# Patient Record
Sex: FEMALE | Race: WHITE | URBAN - METROPOLITAN AREA
[De-identification: names, ages, dates, MRNs, and addresses within clinical notes are randomized per-mention and may not be internally consistent; named-entity substitution may affect disease eponyms.]

---

## 2018-02-23 ENCOUNTER — HOSPITAL ENCOUNTER (OUTPATIENT)
Dept: OTHER | Age: 38
Discharge: HOME | End: 2018-02-23
Attending: NURSE PRACTITIONER | Admitting: NURSE PRACTITIONER

## 2018-02-23 LAB
A/G RATIO: 1.4 (ref 1.1–2.2)
ALBUMIN SERPL-MCNC: 4.6 G/DL (ref 3.4–5)
ALP BLD-CCNC: 63 U/L (ref 40–129)
ALT SERPL-CCNC: 90 U/L (ref 10–40)
ANION GAP SERPL CALCULATED.3IONS-SCNC: 14 MMOL/L (ref 3–16)
AST SERPL-CCNC: 55 U/L (ref 15–37)
BILIRUB SERPL-MCNC: 0.6 MG/DL (ref 0–1)
BUN BLDV-MCNC: 11 MG/DL (ref 7–20)
CALCIUM SERPL-MCNC: 9.8 MG/DL (ref 8.3–10.6)
CHLORIDE BLD-SCNC: 97 MMOL/L (ref 99–110)
CHOLESTEROL, TOTAL: 264 MG/DL (ref 0–199)
CO2: 24 MMOL/L (ref 21–32)
CREAT SERPL-MCNC: 0.5 MG/DL (ref 0.6–1.1)
GFR AFRICAN AMERICAN: >60
GFR NON-AFRICAN AMERICAN: >60
GLOBULIN: 3.2 G/DL
GLUCOSE BLD-MCNC: 96 MG/DL (ref 70–99)
HCT VFR BLD CALC: 44 % (ref 36–48)
HDLC SERPL-MCNC: 26 MG/DL (ref 40–60)
HEMOGLOBIN: 15.2 G/DL (ref 12–16)
LDL CHOLESTEROL CALCULATED: ABNORMAL MG/DL
LDL CHOLESTEROL DIRECT: 162 MG/DL
MCH RBC QN AUTO: 32.1 PG (ref 26–34)
MCHC RBC AUTO-ENTMCNC: 34.5 G/DL (ref 31–36)
MCV RBC AUTO: 93.1 FL (ref 80–100)
PDW BLD-RTO: 12.8 % (ref 12.4–15.4)
PLATELET # BLD: 287 K/UL (ref 135–450)
PMV BLD AUTO: 7.2 FL (ref 5–10.5)
POTASSIUM SERPL-SCNC: 4.3 MMOL/L (ref 3.5–5.1)
RBC # BLD: 4.72 M/UL (ref 4–5.2)
SODIUM BLD-SCNC: 135 MMOL/L (ref 136–145)
TOTAL PROTEIN: 7.8 G/DL (ref 6.4–8.2)
TRIGL SERPL-MCNC: 597 MG/DL (ref 0–150)
VLDLC SERPL CALC-MCNC: ABNORMAL MG/DL
WBC # BLD: 7.1 K/UL (ref 4–11)

## 2018-06-27 ENCOUNTER — OFFICE VISIT (OUTPATIENT)
Dept: ORTHOPEDIC SURGERY | Age: 38
End: 2018-06-27

## 2018-06-27 VITALS
SYSTOLIC BLOOD PRESSURE: 152 MMHG | HEART RATE: 97 BPM | HEIGHT: 66 IN | DIASTOLIC BLOOD PRESSURE: 107 MMHG | WEIGHT: 240 LBS | BODY MASS INDEX: 38.57 KG/M2

## 2018-06-27 DIAGNOSIS — G56.21 NEURITIS OF RIGHT ULNAR NERVE: ICD-10-CM

## 2018-06-27 DIAGNOSIS — M25.521 RIGHT ELBOW PAIN: Primary | ICD-10-CM

## 2018-06-27 PROCEDURE — 99203 OFFICE O/P NEW LOW 30 MIN: CPT | Performed by: ORTHOPAEDIC SURGERY

## 2018-06-27 RX ORDER — METOPROLOL SUCCINATE 50 MG/1
TABLET, EXTENDED RELEASE ORAL
COMMUNITY
Start: 2018-06-10

## 2018-06-27 RX ORDER — COVID-19 ANTIGEN TEST
KIT MISCELLANEOUS
COMMUNITY

## 2019-09-09 NOTE — PROGRESS NOTES
Call srinivas from [facility] that wounds have flared up, redness, swelling and hot to touch. Wound Care doctor started on Prednisone and faith [facility]. They held  off on further abx since pt is seeing Dr. Gaurang Weiss tomorrow.  Faxing CBC for appt as well

## 2019-09-10 ENCOUNTER — OFFICE VISIT (OUTPATIENT)
Dept: INFECTIOUS DISEASES | Age: 39
End: 2019-09-10
Payer: COMMERCIAL

## 2019-09-10 VITALS
WEIGHT: 260.6 LBS | BODY MASS INDEX: 41.88 KG/M2 | TEMPERATURE: 99.3 F | HEIGHT: 66 IN | DIASTOLIC BLOOD PRESSURE: 98 MMHG | SYSTOLIC BLOOD PRESSURE: 156 MMHG

## 2019-09-10 DIAGNOSIS — T78.40XA HYPERSENSITIVITY REACTION, INITIAL ENCOUNTER: Primary | ICD-10-CM

## 2019-09-10 PROCEDURE — 99203 OFFICE O/P NEW LOW 30 MIN: CPT | Performed by: INTERNAL MEDICINE

## 2019-09-10 RX ORDER — PREDNISONE 1 MG/1
1 TABLET ORAL DAILY
COMMUNITY
End: 2019-09-10

## 2019-09-10 RX ORDER — DOXYCYCLINE HYCLATE 100 MG/1
100 CAPSULE ORAL 2 TIMES DAILY
COMMUNITY

## 2019-09-10 RX ORDER — PREDNISONE 20 MG/1
40 TABLET ORAL DAILY
Qty: 14 TABLET | Refills: 0 | Status: SHIPPED | OUTPATIENT
Start: 2019-09-10 | End: 2019-09-17

## 2019-09-10 RX ORDER — LISINOPRIL 20 MG/1
10 TABLET ORAL 2 TIMES DAILY
COMMUNITY

## 2023-03-07 ENCOUNTER — OFFICE VISIT (OUTPATIENT)
Dept: URGENT CARE | Age: 43
End: 2023-03-07

## 2023-03-07 VITALS
SYSTOLIC BLOOD PRESSURE: 130 MMHG | HEIGHT: 66 IN | WEIGHT: 280 LBS | TEMPERATURE: 99.6 F | HEART RATE: 108 BPM | RESPIRATION RATE: 18 BRPM | DIASTOLIC BLOOD PRESSURE: 98 MMHG | OXYGEN SATURATION: 98 % | BODY MASS INDEX: 45 KG/M2

## 2023-03-07 DIAGNOSIS — S91.331A PUNCTURE WOUND OF RIGHT FOOT, INITIAL ENCOUNTER: Primary | ICD-10-CM

## 2023-03-07 DIAGNOSIS — I10 PRIMARY HYPERTENSION: ICD-10-CM

## 2023-03-07 RX ORDER — CEPHALEXIN 500 MG/1
500 CAPSULE ORAL 3 TIMES DAILY
Qty: 15 CAPSULE | Refills: 0 | Status: SHIPPED | OUTPATIENT
Start: 2023-03-07 | End: 2023-03-12

## 2023-03-07 RX ORDER — LEVOFLOXACIN 750 MG/1
750 TABLET ORAL DAILY
Qty: 3 TABLET | Refills: 0 | Status: SHIPPED | OUTPATIENT
Start: 2023-03-07 | End: 2023-03-10

## 2023-03-07 RX ORDER — LISINOPRIL 40 MG/1
TABLET ORAL
COMMUNITY
Start: 2023-01-12
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
## 2023-03-07 NOTE — PATIENT INSTRUCTIONS
Warm soapy soaks three times daily for 20 minutes  Antibiotics as directed  Tetanus updated today  Follow up for any other needs or concerns

## 2023-11-22 ENCOUNTER — OFFICE VISIT (OUTPATIENT)
Dept: URGENT CARE | Age: 43
End: 2023-11-22

## 2023-11-22 VITALS
BODY MASS INDEX: 44.22 KG/M2 | SYSTOLIC BLOOD PRESSURE: 158 MMHG | DIASTOLIC BLOOD PRESSURE: 92 MMHG | OXYGEN SATURATION: 98 % | HEART RATE: 98 BPM | TEMPERATURE: 98.4 F | RESPIRATION RATE: 18 BRPM | WEIGHT: 274 LBS

## 2023-11-22 DIAGNOSIS — L08.9 LOCALIZED INFECTION OF SKIN: ICD-10-CM

## 2023-11-22 DIAGNOSIS — L02.429 BOIL OF LOWER EXTREMITY: Primary | ICD-10-CM

## 2023-11-22 RX ORDER — SULFAMETHOXAZOLE AND TRIMETHOPRIM 800; 160 MG/1; MG/1
1 TABLET ORAL 2 TIMES DAILY
Qty: 20 TABLET | Refills: 0 | Status: SHIPPED | OUTPATIENT
Start: 2023-11-22 | End: 2023-12-02

## 2023-11-22 ASSESSMENT — ENCOUNTER SYMPTOMS
VOMITING: 0
ABDOMINAL PAIN: 0
SHORTNESS OF BREATH: 0
NAUSEA: 0

## 2023-11-22 NOTE — PATIENT INSTRUCTIONS
New Prescriptions    SULFAMETHOXAZOLE-TRIMETHOPRIM (BACTRIM DS;SEPTRA DS) 800-160 MG PER TABLET    Take 1 tablet by mouth 2 times daily for 10 days     Follow up if no improvement in 3 days or sooner for worsening symptoms.

## 2023-11-22 NOTE — PROGRESS NOTES
Negative for shortness of breath. Cardiovascular:  Negative for chest pain. Gastrointestinal:  Negative for abdominal pain, nausea and vomiting. Skin:         Boil on LLE    Neurological:  Negative for headaches. Physical Exam  Constitutional:       Appearance: Normal appearance. HENT:      Mouth/Throat:      Mouth: Mucous membranes are moist.      Pharynx: Oropharynx is clear. Cardiovascular:      Rate and Rhythm: Normal rate. Pulmonary:      Effort: Pulmonary effort is normal.      Breath sounds: Normal breath sounds. Abdominal:      General: Bowel sounds are normal.      Palpations: Abdomen is soft. Tenderness: There is no abdominal tenderness. Musculoskeletal:      Right lower leg: Normal.      Left lower leg: No swelling. No edema. Skin:     Findings: Erythema present. No abscess, bruising, ecchymosis, laceration, petechiae or wound. Comments: Small round erythematous lesion noted to LLE-anterior medial aspect with crusted center. Warm and TTP. No fluctuance or evidence of abscess noted. Neurological:      Mental Status: She is alert. An electronic signature was used to authenticate this note.     --LIOR Monterroso - CNP

## 2025-04-07 ENCOUNTER — APPOINTMENT (OUTPATIENT)
Dept: GENERAL RADIOLOGY | Age: 45
End: 2025-04-07
Payer: COMMERCIAL

## 2025-04-07 ENCOUNTER — HOSPITAL ENCOUNTER (EMERGENCY)
Age: 45
Discharge: HOME | End: 2025-04-07
Attending: STUDENT IN AN ORGANIZED HEALTH CARE EDUCATION/TRAINING PROGRAM
Payer: COMMERCIAL

## 2025-04-07 VITALS
OXYGEN SATURATION: 97 % | SYSTOLIC BLOOD PRESSURE: 176 MMHG | BODY MASS INDEX: 39.62 KG/M2 | HEIGHT: 66 IN | WEIGHT: 246.5 LBS | HEART RATE: 102 BPM | DIASTOLIC BLOOD PRESSURE: 118 MMHG | RESPIRATION RATE: 188 BRPM | TEMPERATURE: 98.3 F

## 2025-04-07 DIAGNOSIS — S61.012A LACERATION OF LEFT THUMB WITHOUT FOREIGN BODY WITHOUT DAMAGE TO NAIL, INITIAL ENCOUNTER: Primary | ICD-10-CM

## 2025-04-07 PROCEDURE — 12001 RPR S/N/AX/GEN/TRNK 2.5CM/<: CPT

## 2025-04-07 PROCEDURE — 99283 EMERGENCY DEPT VISIT LOW MDM: CPT

## 2025-04-07 PROCEDURE — 73130 X-RAY EXAM OF HAND: CPT

## 2025-04-07 ASSESSMENT — PAIN DESCRIPTION - ORIENTATION
ORIENTATION: LEFT
ORIENTATION: LEFT

## 2025-04-07 ASSESSMENT — PAIN DESCRIPTION - DESCRIPTORS
DESCRIPTORS: SHARP;STABBING;THROBBING
DESCRIPTORS: NUMBNESS

## 2025-04-07 ASSESSMENT — PAIN - FUNCTIONAL ASSESSMENT
PAIN_FUNCTIONAL_ASSESSMENT: 0-10
PAIN_FUNCTIONAL_ASSESSMENT: PREVENTS OR INTERFERES SOME ACTIVE ACTIVITIES AND ADLS
PAIN_FUNCTIONAL_ASSESSMENT: PREVENTS OR INTERFERES SOME ACTIVE ACTIVITIES AND ADLS
PAIN_FUNCTIONAL_ASSESSMENT: 0-10

## 2025-04-07 ASSESSMENT — PAIN SCALES - GENERAL
PAINLEVEL_OUTOF10: 6
PAINLEVEL_OUTOF10: 5

## 2025-04-07 ASSESSMENT — PAIN DESCRIPTION - LOCATION
LOCATION: HAND
LOCATION: HAND

## 2025-04-07 ASSESSMENT — PAIN DESCRIPTION - ONSET
ONSET: ON-GOING
ONSET: ON-GOING

## 2025-04-07 ASSESSMENT — PAIN DESCRIPTION - FREQUENCY
FREQUENCY: CONTINUOUS
FREQUENCY: CONTINUOUS

## 2025-04-07 ASSESSMENT — PAIN DESCRIPTION - PAIN TYPE
TYPE: ACUTE PAIN
TYPE: ACUTE PAIN

## 2025-04-07 NOTE — DISCHARGE INSTRUCTIONS
Use antibiotics as directed.  Use Tylenol and ibuprofen as needed for pain.  If your symptoms worsen or you develop new, concerning symptoms, please return to the emergency department.  Please follow-up with your PCP this week.

## 2025-04-07 NOTE — ED PROVIDER NOTES
History of Present Illness       Lili Rdz is a 44 y.o. female with a   Past Medical History:   Diagnosis Date    Hypertension     who presents to the emergency department today with laceration of her hand.  Says she was cutting an orange last night when she had the knife slipped and the palmar surface of her left hand at the base of the first metacarpal.  Reports pain and some swelling.  Applied Steri-Strips afterwards.  Says the knife was clean.  Last had tetanus shot 2 years ago.  Denies neurologic deficits.  Says it happened roughly 12 hours ago.      PMH     History reviewed. No pertinent family history.  No current facility-administered medications for this encounter.     Current Outpatient Medications   Medication Sig Dispense Refill    amoxicillin-clavulanate (AUGMENTIN) 875-125 MG per tablet Take 1 tablet by mouth 2 times daily for 7 days 14 tablet 0    lisinopril (PRINIVIL;ZESTRIL) 40 MG tablet TAKE 1/2 TABLET BY MOUTH TWICE A DAY      metoprolol succinate (TOPROL XL) 50 MG extended release tablet        Allergies   Allergen Reactions    Morphine Nausea And Vomiting     Social History     Socioeconomic History    Marital status:      Spouse name: Not on file    Number of children: Not on file    Years of education: Not on file    Highest education level: Not on file   Occupational History    Not on file   Tobacco Use    Smoking status: Some Days     Types: Cigarettes    Smokeless tobacco: Never   Substance and Sexual Activity    Alcohol use: Yes     Alcohol/week: 16.0 standard drinks of alcohol     Types: 16 Glasses of wine per week    Drug use: No    Sexual activity: Yes     Partners: Male   Other Topics Concern    Not on file   Social History Narrative    Not on file     Social Drivers of Health     Financial Resource Strain: Not on file   Food Insecurity: Not on file   Transportation Needs: Not on file   Physical Activity: Not on file   Stress: Not on file   Social Connections: Not on

## 2025-04-08 ENCOUNTER — PREP FOR PROCEDURE (OUTPATIENT)
Dept: ORTHOPEDIC SURGERY | Age: 45
End: 2025-04-08

## 2025-04-08 ENCOUNTER — OFFICE VISIT (OUTPATIENT)
Dept: ORTHOPEDIC SURGERY | Age: 45
End: 2025-04-08
Payer: COMMERCIAL

## 2025-04-08 VITALS — BODY MASS INDEX: 39.53 KG/M2 | HEIGHT: 66 IN | WEIGHT: 246 LBS

## 2025-04-08 DIAGNOSIS — S64.32XA INJURY OF DIGITAL NERVE OF LEFT THUMB, INITIAL ENCOUNTER: Primary | ICD-10-CM

## 2025-04-08 PROCEDURE — 99204 OFFICE O/P NEW MOD 45 MIN: CPT | Performed by: STUDENT IN AN ORGANIZED HEALTH CARE EDUCATION/TRAINING PROGRAM

## 2025-04-08 NOTE — PROGRESS NOTES
Hand, Upper Extremity and Reconstructive Surgery                Sandra Scott MD                                             History of Present Illness  The patient is a 44-year-old right-hand dominant female who presents with a left thumb injury sustained from a paring knife laceration two days prior. She was evaluated in the emergency department, where radiographic imaging was performed. The swelling has decreased, but she continues to experience persistent numbness in the thumb. The patient expresses concern regarding her ability to perform her duties as a  due to the ongoing numbness in her thumb.    SOCIAL HISTORY  . Smokes occasionally.     Current Outpatient Medications   Medication Instructions    amoxicillin-clavulanate (AUGMENTIN) 875-125 MG per tablet 1 tablet, Oral, 2 TIMES DAILY    lisinopril (PRINIVIL;ZESTRIL) 40 MG tablet TAKE 1/2 TABLET BY MOUTH TWICE A DAY    metoprolol succinate (TOPROL XL) 50 MG extended release tablet No dose, route, or frequency recorded.       Past Medical History:   Diagnosis Date    Hypertension        Past Surgical History:   Procedure Laterality Date    HYSTERECTOMY (CERVIX STATUS UNKNOWN)         Social History     Occupational History    Not on file   Tobacco Use    Smoking status: Some Days     Types: Cigarettes    Smokeless tobacco: Never   Substance and Sexual Activity    Alcohol use: Yes     Alcohol/week: 16.0 standard drinks of alcohol     Types: 16 Glasses of wine per week    Drug use: No    Sexual activity: Yes     Partners: Male         Physical Exam  1 cm laceration over the volar radial aspect of the base of the thumb.  Reduced sensation to light touch in the radial side of the thumb.  To point discrimination to the radial side of the thumb greater than 15 mm, on the ulnar side of the thumb it is 5 mm.  Intact thumb flexion extension      Results  Three-view x-rays of the left hand dated 4/7/2025 was

## 2025-04-09 ENCOUNTER — TELEPHONE (OUTPATIENT)
Dept: ORTHOPEDIC SURGERY | Age: 45
End: 2025-04-09

## 2025-04-09 NOTE — PROGRESS NOTES
Date and time of surgery : 4/10/25 at 1235             Arrival Time:  1035     Bring Picture ID and insurance card.  Please wear simple, loose fitting clothing to the hospital.   Do not bring valuables (money, credit cards, checkbooks, etc.)   Do not wear any makeup (including  eye makeup) and no nail polish or artificial nails on your fingers or toes.  DO NOT wear any jewelry or piercings on day of surgery.  All body piercing jewelry must be removed.  If you have dentures, they will be removed before going to the OR; we will provide you a container.  If you wear contact lenses or glasses, they will be removed; please bring a case for them.  Shower the evening before or morning of surgery with antibacterial soap.  Nothing to eat or drink after midnight the day before surgery.   You may brush your teeth and gargle the morning of surgery.  DO NOT SWALLOW WATER.   The morning of surgery take only Metoprolol with a sip of water.  Hold Lisinopril for 24 hours prior to surgery.  Aspirin, Ibuprofen, Advil, Naproxen, Vitamin E and other Anti-inflammatory products and supplements should be stopped for 5 -7days before surgery or as directed by your physician.  Do not smoke or drink any alcoholic beverages 24 hours prior to surgery.  This includes NA Beer. Refrain from the usage of any recreational drugs, including non-prescribed prescription drugs.   You MUST plan for a responsible adult to stay on site while you are here and take you home after your surgery. You will not be allowed to leave alone or drive yourself home. It is strongly suggested someone stay with you the first 24 hrs. Your surgery will be cancelled if you do not have a ride home.  To help prevent infection, change your sheets the night before surgery.   If you  have a Living Will and Durable Power of  for Healthcare, please bring in a copy.  Notify your Surgeon if you develop any illness between now and time of surgery. Cough, cold, fever, sore  throat, nausea, vomiting, etc.  Please notify your surgeon if you experience dizziness, shortness of breath or blurred vision between now & the time of your surgery  To provide excellent care visitors will be limited to two per room at any given time. No visitors under the age of 14.  If you use oxygen and have a portable tank please bring it with you the DOS  For your convenience [facility] has a pharmacy on site to fill your prescriptions.     *Please call pre admission testing if you have any further questions             [place]      [phone]                            Address: [address] [place] [address]     When you pull into the hospital and are looking at the main hospital entrance, turn right.   We are a tan building to the right of the main entrance.   [facility] over the door.                                                             Revision History

## 2025-04-09 NOTE — PROGRESS NOTES
_______Wt.    ________Pharmacy                         _______SCD                      ______TED HOSE    COVID + _______DATE    ___OK per Anesthesia   ____OK per Surgeon

## 2025-04-09 NOTE — TELEPHONE ENCOUNTER
Spoke with patient, she was already aware of the location and date, she had her paper with the address from the surgery folder.  I contacted PAT to inform them pt has not yet heard from them and they are calling her now.

## 2025-04-09 NOTE — TELEPHONE ENCOUNTER
PT IS REQUESTING A CALL BACK IN REGARDS TO SURGERY DETAILS FOR UPCOMING SURGERY ON 4-. PT NEEDS TIME, ARRIVAL TIME, AND LOCATION FOR SURGERY.

## 2025-04-09 NOTE — PROGRESS NOTES
Lili Rdz    Age 44 y.o.    female    1980    N [id number]    4/10/2025  Arrival Time_____________  OR Time____________85 Min     Procedure(s):  LEFT THUMB NERVE REPAIR, POSSIBLE NERVE GRAFT                      Monitor Anesthesia Care    Surgeon(s):  Sandra Scott, MD       Phone [phone] (home) [phone] (work)    [facility] [place]  Date  Info Source  Home  Cell         Work  _____________________________________________________________________  _____________________________________________________________________  _____________________________________________________________________  _____________________________________________________________________  _____________________________________________________________________    PCP _____________________________ Phone_________________     H&P  ________________  Bringing      Chart              Epic      DOS      Called________  EKG ________________   Bringing      Chart              Epic      DOS      Called________  LABS________________   Bringing     Chart              Epic      DOS      Called________  Cardiac Clearance ______ Bringing      Chart              Epic      DOS      Called________  Pulmonary Clearance____ Bringing      Chart              Epic      DOS      Called________    Cardiologist________________________ Phone___________________________  Pulmonologist_______________________Phone___________________________    ? Advance Directives   ? Baptism concerns / Waiver on Chart            [facility]________________  ? Pre-op Instructions Given /Understood          _________________________________  ? Directions to Surgery Center                          _________________________________  ? Transportation Home_______________      __________________________________  ? Crutches/Walker__________________        __________________________________    Orders: Hard copy/ EPIC                 Transcribed/ EPIC

## 2025-04-10 ENCOUNTER — ANESTHESIA EVENT (OUTPATIENT)
Dept: OPERATING ROOM | Age: 45
End: 2025-04-10
Payer: COMMERCIAL

## 2025-04-10 ENCOUNTER — HOSPITAL ENCOUNTER (OUTPATIENT)
Age: 45
Setting detail: OUTPATIENT SURGERY
Discharge: HOME | End: 2025-04-10
Attending: STUDENT IN AN ORGANIZED HEALTH CARE EDUCATION/TRAINING PROGRAM | Admitting: STUDENT IN AN ORGANIZED HEALTH CARE EDUCATION/TRAINING PROGRAM
Payer: COMMERCIAL

## 2025-04-10 ENCOUNTER — ANESTHESIA (OUTPATIENT)
Dept: OPERATING ROOM | Age: 45
End: 2025-04-10
Payer: COMMERCIAL

## 2025-04-10 VITALS
BODY MASS INDEX: 38.57 KG/M2 | RESPIRATION RATE: 16 BRPM | SYSTOLIC BLOOD PRESSURE: 157 MMHG | TEMPERATURE: 97.3 F | HEART RATE: 84 BPM | DIASTOLIC BLOOD PRESSURE: 97 MMHG | OXYGEN SATURATION: 97 % | HEIGHT: 66 IN | WEIGHT: 240 LBS

## 2025-04-10 DIAGNOSIS — S64.32XA INJURY OF DIGITAL NERVE OF LEFT THUMB, INITIAL ENCOUNTER: Primary | ICD-10-CM

## 2025-04-10 PROCEDURE — 2500000003 HC RX 250 WO HCPCS: Performed by: NURSE ANESTHETIST, CERTIFIED REGISTERED

## 2025-04-10 PROCEDURE — 6360000002 HC RX W HCPCS: Performed by: STUDENT IN AN ORGANIZED HEALTH CARE EDUCATION/TRAINING PROGRAM

## 2025-04-10 PROCEDURE — 2580000003 HC RX 258: Performed by: ANESTHESIOLOGY

## 2025-04-10 PROCEDURE — 6360000002 HC RX W HCPCS: Performed by: ANESTHESIOLOGY

## 2025-04-10 PROCEDURE — 3700000001 HC ADD 15 MINUTES (ANESTHESIA): Performed by: STUDENT IN AN ORGANIZED HEALTH CARE EDUCATION/TRAINING PROGRAM

## 2025-04-10 PROCEDURE — 2580000003 HC RX 258: Performed by: NURSE ANESTHETIST, CERTIFIED REGISTERED

## 2025-04-10 PROCEDURE — 2500000003 HC RX 250 WO HCPCS: Performed by: STUDENT IN AN ORGANIZED HEALTH CARE EDUCATION/TRAINING PROGRAM

## 2025-04-10 PROCEDURE — 2709999900 HC NON-CHARGEABLE SUPPLY: Performed by: STUDENT IN AN ORGANIZED HEALTH CARE EDUCATION/TRAINING PROGRAM

## 2025-04-10 PROCEDURE — 6360000002 HC RX W HCPCS: Performed by: NURSE ANESTHETIST, CERTIFIED REGISTERED

## 2025-04-10 PROCEDURE — 3600000004 HC SURGERY LEVEL 4 BASE: Performed by: STUDENT IN AN ORGANIZED HEALTH CARE EDUCATION/TRAINING PROGRAM

## 2025-04-10 PROCEDURE — 64831 REPAIR OF DIGIT NERVE: CPT | Performed by: STUDENT IN AN ORGANIZED HEALTH CARE EDUCATION/TRAINING PROGRAM

## 2025-04-10 PROCEDURE — 2720000010 HC SURG SUPPLY STERILE: Performed by: STUDENT IN AN ORGANIZED HEALTH CARE EDUCATION/TRAINING PROGRAM

## 2025-04-10 PROCEDURE — 7100000011 HC PHASE II RECOVERY - ADDTL 15 MIN: Performed by: STUDENT IN AN ORGANIZED HEALTH CARE EDUCATION/TRAINING PROGRAM

## 2025-04-10 PROCEDURE — 3700000000 HC ANESTHESIA ATTENDED CARE: Performed by: STUDENT IN AN ORGANIZED HEALTH CARE EDUCATION/TRAINING PROGRAM

## 2025-04-10 PROCEDURE — 3600000014 HC SURGERY LEVEL 4 ADDTL 15MIN: Performed by: STUDENT IN AN ORGANIZED HEALTH CARE EDUCATION/TRAINING PROGRAM

## 2025-04-10 PROCEDURE — 7100000010 HC PHASE II RECOVERY - FIRST 15 MIN: Performed by: STUDENT IN AN ORGANIZED HEALTH CARE EDUCATION/TRAINING PROGRAM

## 2025-04-10 PROCEDURE — C1763 CONN TISS, NON-HUMAN: HCPCS | Performed by: STUDENT IN AN ORGANIZED HEALTH CARE EDUCATION/TRAINING PROGRAM

## 2025-04-10 DEVICE — AXOGUARD HA+ NERVE PROTECTOR IS PROVIDED STERILE, FOR SINGLE USE ONLY, AND IN A VARIETY OF SIZES TO MEET THE SURGEON’S NEEDS.K223640: AXOGUARD HA+ NERVE PROTECTOR IS A SURGICAL IMPLANT THAT PROVIDES NON-CONSTRICTING PROTECTION FOR NON-TRANSECTED PERIPHERAL NERVE INJURIES. AXOGUARD HA+ NERVE PROTECTOR IS DESIGNED TO BE AN INTERFACE BETWEEN THE NERVE AND THE SURROUNDING TISSUE. AXOGUARD HA+ NERVE PROTECTOR IS COMPRISED OF AN EXTRACELLULAR MATRIX (ECM) AND IS FULLY REMODELED DURING THE HEALING PROCESS. WHEN HYDRATED, AXOGUARD HA+ NERVE PROTECTOR IS EASY TO HANDLE, SOFT, PLIABLE, NONFRIABLE, AND POROUS. THE LUBRICANT COATING ON AXOGUARD HA+ NERVE PROTECTOR IS COMPOSED OF SODIUM HYALURONATE AND SODIUM ALGINATE. WHEN HYDRATED, THE LUBRICANT COATING REDUCES FRICTION BETWEEN THE NERVE AND THE SURROUNDING TISSUE. AXOGUARD HA+ NERVE PROTECTOR IS FLEXIBLE TO ACCOMMODATE MOVEMENT OF THE JOINT AND ASSOCIATED TENDONS AND HAS SUFFICIENT MECHANICAL STRENGTH TO HOLD SUTURES. K231708: AXOGUARD HA+ NERVE PROTECTOR IS A SURGICAL IMPLANT THAT PROVIDES NON-CONSTRICTING PROTECTION FOR PERIPHERAL NERVE INJURIES. AXOGUARD HA+ NERVE PROTECTOR IS DESIGNED TO AID IN COAPTATION AND PROTECTION OF PERIPHERAL NERVE INJURIES BY SERVING AS AN INTERFACE BETWEEN THE NERVE AND THE SURROUNDING TISSUE AND ALSO PROVIDES TENSION RELIEF WHEN USED AS A COAPTATION AID. AXOGUARD HA+ NERVE PROTECTOR IS COMPRISED OF AN EXTRACELLULAR MATRIX (ECM) AND IS FULLY REMODELED DURING THE HEALING PROCESS. WHEN HYDRATED, AXOGUARD HA+ NERVE PROTECTOR IS EASY TO HANDLE, SOFT, PLIABLE, NONFRIABLE, AND POROUS. THE LUBRICANT COATING ON AXOGUARD HA+ NERVE PROTECTOR IS COMPOSED OF SODIUM HYALURONATE AND SODIUM ALGINATE. WHEN HYDRATED, THE LUBRICANT COATING REDUCES FRICTION BETWEEN THE NERVE AND THE SURROUNDING TISSUE. AXOGUARD HA+ NERVE PROTECTOR IS FLEXIBLE TO ACCOMMODATE MOVEMENT OF THE JOINT AND HAS SUFFICIENT MECHANICAL STRENGTH TO HOLD SUTURES.
Type: IMPLANTABLE DEVICE | Site: THUMB | Status: FUNCTIONAL
Brand: AXOGUARD HA+ NERVE PROTECTOR

## 2025-04-10 RX ORDER — ALBUTEROL SULFATE 90 UG/1
2 INHALANT RESPIRATORY (INHALATION) EVERY 6 HOURS PRN
COMMUNITY
Start: 2024-12-05

## 2025-04-10 RX ORDER — SODIUM CHLORIDE 0.9 % (FLUSH) 0.9 %
5-40 SYRINGE (ML) INJECTION EVERY 12 HOURS SCHEDULED
Status: DISCONTINUED | OUTPATIENT
Start: 2025-04-10 | End: 2025-04-10 | Stop reason: HOSPADM

## 2025-04-10 RX ORDER — PROPOFOL 10 MG/ML
INJECTION, EMULSION INTRAVENOUS
Status: DISCONTINUED | OUTPATIENT
Start: 2025-04-10 | End: 2025-04-10 | Stop reason: SDUPTHER

## 2025-04-10 RX ORDER — MIDAZOLAM HYDROCHLORIDE 1 MG/ML
INJECTION, SOLUTION INTRAMUSCULAR; INTRAVENOUS
Status: DISCONTINUED | OUTPATIENT
Start: 2025-04-10 | End: 2025-04-10 | Stop reason: SDUPTHER

## 2025-04-10 RX ORDER — SODIUM CHLORIDE 9 MG/ML
INJECTION, SOLUTION INTRAVENOUS PRN
Status: DISCONTINUED | OUTPATIENT
Start: 2025-04-10 | End: 2025-04-10 | Stop reason: HOSPADM

## 2025-04-10 RX ORDER — SODIUM CHLORIDE 0.9 % (FLUSH) 0.9 %
5-40 SYRINGE (ML) INJECTION PRN
Status: DISCONTINUED | OUTPATIENT
Start: 2025-04-10 | End: 2025-04-10 | Stop reason: HOSPADM

## 2025-04-10 RX ORDER — OXYCODONE HYDROCHLORIDE 5 MG/1
5 TABLET ORAL PRN
Status: DISCONTINUED | OUTPATIENT
Start: 2025-04-10 | End: 2025-04-10 | Stop reason: HOSPADM

## 2025-04-10 RX ORDER — DIPHENHYDRAMINE HYDROCHLORIDE 50 MG/ML
12.5 INJECTION, SOLUTION INTRAMUSCULAR; INTRAVENOUS
Status: DISCONTINUED | OUTPATIENT
Start: 2025-04-10 | End: 2025-04-10 | Stop reason: HOSPADM

## 2025-04-10 RX ORDER — MIDAZOLAM HYDROCHLORIDE 1 MG/ML
2 INJECTION, SOLUTION INTRAMUSCULAR; INTRAVENOUS
Status: DISCONTINUED | OUTPATIENT
Start: 2025-04-10 | End: 2025-04-10 | Stop reason: HOSPADM

## 2025-04-10 RX ORDER — LABETALOL HYDROCHLORIDE 5 MG/ML
5 INJECTION, SOLUTION INTRAVENOUS EVERY 10 MIN PRN
Status: DISCONTINUED | OUTPATIENT
Start: 2025-04-10 | End: 2025-04-10 | Stop reason: HOSPADM

## 2025-04-10 RX ORDER — LIDOCAINE HYDROCHLORIDE 20 MG/ML
INJECTION, SOLUTION INFILTRATION; PERINEURAL
Status: DISCONTINUED | OUTPATIENT
Start: 2025-04-10 | End: 2025-04-10 | Stop reason: SDUPTHER

## 2025-04-10 RX ORDER — FENTANYL CITRATE 50 UG/ML
INJECTION, SOLUTION INTRAMUSCULAR; INTRAVENOUS
Status: DISCONTINUED | OUTPATIENT
Start: 2025-04-10 | End: 2025-04-10 | Stop reason: SDUPTHER

## 2025-04-10 RX ORDER — HYDROCODONE BITARTRATE AND ACETAMINOPHEN 5; 325 MG/1; MG/1
1 TABLET ORAL EVERY 6 HOURS PRN
Qty: 15 TABLET | Refills: 0 | Status: SHIPPED | OUTPATIENT
Start: 2025-04-10 | End: 2025-04-17

## 2025-04-10 RX ORDER — MAGNESIUM HYDROXIDE 1200 MG/15ML
LIQUID ORAL CONTINUOUS PRN
Status: COMPLETED | OUTPATIENT
Start: 2025-04-10 | End: 2025-04-10

## 2025-04-10 RX ORDER — ONDANSETRON 2 MG/ML
4 INJECTION INTRAMUSCULAR; INTRAVENOUS
Status: DISCONTINUED | OUTPATIENT
Start: 2025-04-10 | End: 2025-04-10 | Stop reason: HOSPADM

## 2025-04-10 RX ORDER — SODIUM CHLORIDE, SODIUM LACTATE, POTASSIUM CHLORIDE, CALCIUM CHLORIDE 600; 310; 30; 20 MG/100ML; MG/100ML; MG/100ML; MG/100ML
INJECTION, SOLUTION INTRAVENOUS
Status: DISCONTINUED | OUTPATIENT
Start: 2025-04-10 | End: 2025-04-10 | Stop reason: SDUPTHER

## 2025-04-10 RX ORDER — SODIUM CHLORIDE, SODIUM LACTATE, POTASSIUM CHLORIDE, CALCIUM CHLORIDE 600; 310; 30; 20 MG/100ML; MG/100ML; MG/100ML; MG/100ML
INJECTION, SOLUTION INTRAVENOUS CONTINUOUS
Status: DISCONTINUED | OUTPATIENT
Start: 2025-04-10 | End: 2025-04-10 | Stop reason: HOSPADM

## 2025-04-10 RX ORDER — NALOXONE HYDROCHLORIDE 0.4 MG/ML
INJECTION, SOLUTION INTRAMUSCULAR; INTRAVENOUS; SUBCUTANEOUS PRN
Status: DISCONTINUED | OUTPATIENT
Start: 2025-04-10 | End: 2025-04-10 | Stop reason: HOSPADM

## 2025-04-10 RX ORDER — OXYCODONE HYDROCHLORIDE 5 MG/1
10 TABLET ORAL PRN
Status: DISCONTINUED | OUTPATIENT
Start: 2025-04-10 | End: 2025-04-10 | Stop reason: HOSPADM

## 2025-04-10 RX ORDER — MEPERIDINE HYDROCHLORIDE 50 MG/ML
12.5 INJECTION INTRAMUSCULAR; INTRAVENOUS; SUBCUTANEOUS EVERY 5 MIN PRN
Status: DISCONTINUED | OUTPATIENT
Start: 2025-04-10 | End: 2025-04-10 | Stop reason: HOSPADM

## 2025-04-10 RX ADMIN — LABETALOL HYDROCHLORIDE 5 MG: 5 INJECTION INTRAVENOUS at 16:51

## 2025-04-10 RX ADMIN — Medication 10 MG: at 15:33

## 2025-04-10 RX ADMIN — Medication 10 MG: at 15:15

## 2025-04-10 RX ADMIN — SODIUM CHLORIDE, SODIUM LACTATE, POTASSIUM CHLORIDE, AND CALCIUM CHLORIDE: .6; .31; .03; .02 INJECTION, SOLUTION INTRAVENOUS at 12:12

## 2025-04-10 RX ADMIN — LIDOCAINE HYDROCHLORIDE 100 MG: 20 INJECTION, SOLUTION INFILTRATION; PERINEURAL at 15:16

## 2025-04-10 RX ADMIN — PROPOFOL 100 MG: 10 INJECTION, EMULSION INTRAVENOUS at 15:16

## 2025-04-10 RX ADMIN — FENTANYL CITRATE 25 MCG: 50 INJECTION, SOLUTION INTRAMUSCULAR; INTRAVENOUS at 15:43

## 2025-04-10 RX ADMIN — Medication 10 MG: at 15:19

## 2025-04-10 RX ADMIN — SODIUM CHLORIDE, SODIUM LACTATE, POTASSIUM CHLORIDE, AND CALCIUM CHLORIDE: .6; .31; .03; .02 INJECTION, SOLUTION INTRAVENOUS at 15:06

## 2025-04-10 RX ADMIN — FENTANYL CITRATE 25 MCG: 50 INJECTION, SOLUTION INTRAMUSCULAR; INTRAVENOUS at 15:18

## 2025-04-10 RX ADMIN — FENTANYL CITRATE 25 MCG: 50 INJECTION, SOLUTION INTRAMUSCULAR; INTRAVENOUS at 15:20

## 2025-04-10 RX ADMIN — PROPOFOL 100 MCG/KG/MIN: 10 INJECTION, EMULSION INTRAVENOUS at 15:16

## 2025-04-10 RX ADMIN — FENTANYL CITRATE 25 MCG: 50 INJECTION, SOLUTION INTRAMUSCULAR; INTRAVENOUS at 15:31

## 2025-04-10 RX ADMIN — PROPOFOL 100 MG: 10 INJECTION, EMULSION INTRAVENOUS at 15:20

## 2025-04-10 RX ADMIN — MIDAZOLAM 2 MG: 1 INJECTION INTRAMUSCULAR; INTRAVENOUS at 15:11

## 2025-04-10 ASSESSMENT — PAIN SCALES - GENERAL
PAINLEVEL_OUTOF10: 4
PAINLEVEL_OUTOF10: 0

## 2025-04-10 ASSESSMENT — PAIN - FUNCTIONAL ASSESSMENT: PAIN_FUNCTIONAL_ASSESSMENT: 0-10

## 2025-04-10 NOTE — ANESTHESIA POSTPROCEDURE EVALUATION
Department of Anesthesiology  Postprocedure Note    Patient: Lili Rdz  MRN: [id number]  YOB: 1980  Date of evaluation: 4/10/2025    Procedure Summary       Date: 04/10/25 Room / Location: [address]    Anesthesia Start: 1511 Anesthesia Stop: 1614    Procedure: LEFT THUMB NERVE REPAIR, (Left: Fingers) Diagnosis:       Injury of digital nerve of left thumb      (Injury of digital nerve of left thumb [S64.32XA])    Surgeons: Sandra Scott MD Responsible Provider: Eric Ayon MD    Anesthesia Type: general ASA Status: 3            Anesthesia Type: No value filed.    Leah Phase I: Leah Score: 10    Leah Phase II: Leah Score: 10    Anesthesia Post Evaluation    Comments: Postoperative Anesthesia Note    Name:    Lili Rdz  MRN:      [id number]    Patient Vitals in the past 12 hrs:  04/10/25 1700, BP:(!) 157/97, Pulse:84, SpO2:97 %  04/10/25 1645, BP:(!) 165/110  04/10/25 1625, BP:(!) 169/110, Temp:97.3 °F (36.3 °C), Temp src:Temporal, Pulse:91, Resp:16, SpO2:97 %  04/10/25 1612, BP:(!) 169/109, Temp:97.3 °F (36.3 °C), Temp src:Temporal, Pulse:92, Resp:16, SpO2:97 %  04/10/25 1151, BP:(!) 162/113, Temp:98.6 °F (37 °C), Temp src:Oral, Pulse:88, Resp:16, SpO2:98 %     LABS:    CBC  Lab Results       Component                Value               Date/Time                  WBC                      7.1                 02/23/2018 10:40 AM        HGB                      15.2                02/23/2018 10:40 AM        HCT                      44.0                02/23/2018 10:40 AM        PLT                      287                 02/23/2018 10:40 AM   RENAL  Lab Results       Component                Value               Date/Time                  NA                       135 (L)             02/23/2018 10:40 AM        K                        4.3                 02/23/2018 10:40 AM        CL                       97 (L)

## 2025-04-10 NOTE — DISCHARGE INSTRUCTIONS
ORTHOPAEDICS AND SPORTS MEDICINE           HAND SURGERY - OSMAN MESSER MD                              POST-OPERATIVE DISCHARGE INSTRUCTIONS    PRESCRIPTIONS:  You have been given a prescription to be taken as directed for post-operative pain control.  Take over-the-counter Colace, 100mg by mouth twice a day while taking narcotic pain medications to help prevent constipation.  You may also take over the counter ibuprofen/aleve and tylenol for pain. Take this as directed on the packaging. Do not exceed 3000 mg tylenol/acetaminophen in 24 hours.     Ibuprofen 600-800 mg (3-4) tablets by mouth every 6 hours as needed for pain.      OR   Aleve 2 tablets by mouth every 12 hours (twice daily) as needed for pain.      AND/OR   Tylenol 1000 mg (2 tablets) every 8 hours as needed for pain.    4. Please use your pain medication carefully, as refills are limited and you may not be provided with one.   5. Prescriptions are only filled in person during a clinic visit.   6. As stated above, please  use over the counter pain medicine - it will also be helpful with decreasing your swelling.       ANESTHESIA:  1.After your surgery, post-surgical discomfort or pain is likely. This discomfort can last several days to a few weeks. At certain times of the day your discomfort may be more intense.     2. Did you receive a nerve block? A nerve block can provide pain relief for one hour to two days after your surgery. As long as the nerve block is working, you will experience little or no sensation in the area the surgeon operated on. As the nerve block wears off, you will begin to experience pain or discomfort. It is very important that you begin taking your prescribed pain medication before the nerve block fully wears off. Treating your pain at the first sign of the block wearing off will ensure your pain is better controlled and more tolerable when full-sensation returns. Do not wait until the pain is intolerable, as the

## 2025-04-10 NOTE — H&P
Hand, Upper Extremity and Reconstructive Surgery                Sandra Scott MD                                             History of Present Illness  The patient is a 44-year-old right-hand dominant female who presents with a left thumb injury sustained from a paring knife laceration two days prior. She was evaluated in the emergency department, where radiographic imaging was performed. The swelling has decreased, but she continues to experience persistent numbness in the thumb. The patient expresses concern regarding her ability to perform her duties as a  due to the ongoing numbness in her thumb.    SOCIAL HISTORY  . Smokes occasionally.     Current Outpatient Medications   Medication Instructions    albuterol sulfate HFA (PROVENTIL;VENTOLIN;PROAIR) 108 (90 Base) MCG/ACT inhaler 2 puffs, EVERY 6 HOURS PRN    amoxicillin-clavulanate (AUGMENTIN) 875-125 MG per tablet 1 tablet, Oral, 2 TIMES DAILY    lisinopril (PRINIVIL;ZESTRIL) 40 MG tablet TAKE 1/2 TABLET BY MOUTH TWICE A DAY    metoprolol succinate (TOPROL XL) 50 mg, DAILY    Multiple Vitamin (MULTIVITAMIN ADULT PO) DAILY       Past Medical History:   Diagnosis Date    Hypertension        Past Surgical History:   Procedure Laterality Date    BREAST REDUCTION SURGERY Bilateral 1998    BUNIONECTOMY Bilateral     bunionectomy and osteotomy    HYSTERECTOMY (CERVIX STATUS UNKNOWN)         Social History     Occupational History    Not on file   Tobacco Use    Smoking status: Some Days     Types: Cigarettes    Smokeless tobacco: Never   Vaping Use    Vaping status: Never Used   Substance and Sexual Activity    Alcohol use: Yes     Comment: 16 drinks per week    Drug use: No    Sexual activity: Yes     Partners: Male         Physical Exam  1 cm laceration over the volar radial aspect of the base of the thumb.  Reduced sensation to light touch in the radial side of the thumb.  To point discrimination to the  radial side of the thumb greater than 15 mm, on the ulnar side of the thumb it is 5 mm.  Intact thumb flexion extension      Results  Three-view x-rays of the left hand dated 4/7/2025 was independently reviewed and discussed with the patient.  Films reveal no evidence of fracture        Assessment & Plan  1. Left thumb nerve injury with possible radial digital nerve injury.  Explained that given her dense paresthesias and that her sharp laceration is likely consistent with nerve injury.  Explained that this will not recover on its own.  Explained that this can be explored and repaired.  Details of surgery were discussed.  She understands that an extra nerve graft may be necessary.  Risks were discussed including persistent pain, numbness, neuroma, scar, damage to trying structures infection, wound healing complications and risk of anesthesia.  Patient expressed understanding agrees with plan.  Will proceed with surgery scheduling for left thumb radial digital nerve exploration and repair with possible nerve allograft.      No orders of the defined types were placed in this encounter.      The patient (or guardian, if applicable) and other individuals in attendance with the patient were advised that Artificial Intelligence will be utilized during this visit to record, process the conversation to generate a clinical note, and support improvement of the AI technology. The patient (or guardian, if applicable) and other individuals in attendance at the appointment consented to the use of AI, including the recording.

## 2025-04-10 NOTE — ANESTHESIA PRE PROCEDURE
Department of Anesthesiology  Preprocedure Note       Name:  Lili Rdz   Age:  44 y.o.  :  1980                                          MRN:  [id number]         Date:  4/10/2025      Surgeon: Surgeon(s):  Sandra Scott MD    Procedure: Procedure(s):  LEFT THUMB NERVE REPAIR, POSSIBLE NERVE GRAFT    Medications prior to admission:   Prior to Admission medications    Medication Sig Start Date End Date Taking? Authorizing Provider   Multiple Vitamin (MULTIVITAMIN ADULT PO) Take by mouth daily   Yes Carol Aggarwal MD   amoxicillin-clavulanate (AUGMENTIN) 875-125 MG per tablet Take 1 tablet by mouth 2 times daily for 7 days 25  Feng Mckeon MD   lisinopril (PRINIVIL;ZESTRIL) 40 MG tablet TAKE 1/2 TABLET BY MOUTH TWICE A DAY 23   Carol Aggarwal MD   metoprolol succinate (TOPROL XL) 50 MG extended release tablet Take 1 tablet by mouth daily 6/10/18   Carol Aggarwal MD       Current medications:    No current facility-administered medications for this encounter.     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

## 2025-04-14 NOTE — OP NOTE
The wound was irrigated.  The incision was closed using 4-0 nylon sutures.  A thumb spica splint was applied. The patient was awoken from MAC anesthesia and transported to the PACU in stable condition.      Implant Name Type Inv. Item Serial No.  Lot No. LRB No. Used Action   PROTECTOR NERVE L 4 X W 2 CM PORCINE SODIUM HYALURONATE - [id number]  PROTECTOR NERVE L 4 X W 2 CM PORCINE SODIUM HYALURONATE  [employer]- [id number] Left 1 Implanted       Tourniquet time:   Total Tourniquet Time Documented:  Arm  (Left) - 36 minutes  Total: Arm  (Left) - 36 minutes    Specimen: None   EBL: 10cc    Complications: None   Post-operative instructions given: yes  Disposition:  The patient will be discharged from PACU and will see us in 2 weeks to evaluate her wounds.       Electronically signed by: Sandra Scott MD  4/13/2025; 9:04 PM

## 2025-04-23 ENCOUNTER — OFFICE VISIT (OUTPATIENT)
Dept: ORTHOPEDIC SURGERY | Age: 45
End: 2025-04-23

## 2025-04-23 VITALS — WEIGHT: 240 LBS | HEIGHT: 66 IN | BODY MASS INDEX: 38.57 KG/M2

## 2025-04-23 DIAGNOSIS — S64.32XA INJURY OF DIGITAL NERVE OF LEFT THUMB, INITIAL ENCOUNTER: Primary | ICD-10-CM

## 2025-04-23 PROCEDURE — 99024 POSTOP FOLLOW-UP VISIT: CPT | Performed by: STUDENT IN AN ORGANIZED HEALTH CARE EDUCATION/TRAINING PROGRAM

## 2025-04-23 NOTE — PROGRESS NOTES
Hand, Upper Extremity and Reconstructive Surgery                Sandra Scott MD                                         Surgery-repair of radial digital nerve to the thumb, 4/13/2025    History of Present Illness  Interval update 4/23/2025-patient presents for follow-up of the above surgery.  She has been in a splint since surgery.  She has no concerns at this time    History-  The patient is a 44-year-old right-hand dominant female who presents with a left thumb injury sustained from a paring knife laceration two days prior. She was evaluated in the emergency department, where radiographic imaging was performed. The swelling has decreased, but she continues to experience persistent numbness in the thumb. The patient expresses concern regarding her ability to perform her duties as a  due to the ongoing numbness in her thumb.    SOCIAL HISTORY  . Smokes occasionally.     Current Outpatient Medications   Medication Instructions    albuterol sulfate HFA (PROVENTIL;VENTOLIN;PROAIR) 108 (90 Base) MCG/ACT inhaler 2 puffs, EVERY 6 HOURS PRN    lisinopril (PRINIVIL;ZESTRIL) 40 MG tablet TAKE 1/2 TABLET BY MOUTH TWICE A DAY    metoprolol succinate (TOPROL XL) 50 mg, DAILY    Multiple Vitamin (MULTIVITAMIN ADULT PO) DAILY       Past Medical History:   Diagnosis Date    Hypertension        Past Surgical History:   Procedure Laterality Date    BREAST REDUCTION SURGERY Bilateral 1998    BUNIONECTOMY Bilateral     bunionectomy and osteotomy    FINGER SURGERY Left 4/10/2025    LEFT THUMB NERVE REPAIR, performed by Sandra Scott MD at [facility] OR    HYSTERECTOMY (CERVIX STATUS UNKNOWN)         Social History     Occupational History    Not on file   Tobacco Use    Smoking status: Some Days     Types: Cigarettes    Smokeless tobacco: Never   Vaping Use    Vaping status: Never Used   Substance and Sexual Activity    Alcohol use: Yes     Comment: 16 drinks per week

## 2025-05-01 ENCOUNTER — TELEPHONE (OUTPATIENT)
Dept: ORTHOPEDIC SURGERY | Age: 45
End: 2025-05-01

## 2025-05-21 ENCOUNTER — OFFICE VISIT (OUTPATIENT)
Dept: ORTHOPEDIC SURGERY | Age: 45
End: 2025-05-21

## 2025-05-21 VITALS — HEIGHT: 66 IN | BODY MASS INDEX: 38.57 KG/M2 | WEIGHT: 240 LBS

## 2025-05-21 DIAGNOSIS — S64.32XA INJURY OF DIGITAL NERVE OF LEFT THUMB, INITIAL ENCOUNTER: Primary | ICD-10-CM

## 2025-05-21 PROCEDURE — 99024 POSTOP FOLLOW-UP VISIT: CPT | Performed by: STUDENT IN AN ORGANIZED HEALTH CARE EDUCATION/TRAINING PROGRAM

## 2025-05-21 RX ORDER — PREDNISONE 5 MG/1
TABLET ORAL
Qty: 45 TABLET | Refills: 0 | Status: SHIPPED | OUTPATIENT
Start: 2025-05-21

## 2025-05-21 NOTE — PROGRESS NOTES
Hand, Upper Extremity and Reconstructive Surgery                Sandra Scott MD                                         Surgery-repair of radial digital nerve to the thumb, 4/13/2025    History of Present Illness  Interval update 5/21/2025-patient presents for follow-up of the above surgery.  She is weeks postop.  Reports thick scar tissue around her incision and tenderness around the incision.  She feels like the scar tissue is limiting her motion.  Continues to have some numbness of the thumb although improving.    History-  The patient is a 44-year-old right-hand dominant female who presents with a left thumb injury sustained from a paring knife laceration two days prior. She was evaluated in the emergency department, where radiographic imaging was performed. The swelling has decreased, but she continues to experience persistent numbness in the thumb. The patient expresses concern regarding her ability to perform her duties as a  due to the ongoing numbness in her thumb.    SOCIAL HISTORY  . Smokes occasionally.        SOCIAL HISTORY  Occupations: Works at a school     Current Outpatient Medications   Medication Instructions    albuterol sulfate HFA (PROVENTIL;VENTOLIN;PROAIR) 108 (90 Base) MCG/ACT inhaler 2 puffs, EVERY 6 HOURS PRN    lisinopril (PRINIVIL;ZESTRIL) 40 MG tablet TAKE 1/2 TABLET BY MOUTH TWICE A DAY    metoprolol succinate (TOPROL XL) 50 mg, DAILY    Multiple Vitamin (MULTIVITAMIN ADULT PO) DAILY    predniSONE (DELTASONE) 5 MG tablet Take 4 pills for 5 days (20 mg), then take 3 pills for 5 days (15 mg), then take 2 pills for 5 days (10 mg)       Past Medical History:   Diagnosis Date    Hypertension        Past Surgical History:   Procedure Laterality Date    BREAST REDUCTION SURGERY Bilateral 1998    BUNIONECTOMY Bilateral     bunionectomy and osteotomy    FINGER SURGERY Left 4/10/2025    LEFT THUMB NERVE REPAIR, performed by [facility],

## (undated) DEVICE — GOWN,SIRUS,NON REINFRCD,LARGE,SET IN SL: Brand: MEDLINE

## (undated) DEVICE — SKIN PREP TRAY W/CHG: Brand: MEDLINE INDUSTRIES, INC.

## (undated) DEVICE — PADDING CAST W4INXL4YD NONSTERILE COT RAYON MICROPLEATED

## (undated) DEVICE — GLOVE ORANGE PI 8   MSG9080

## (undated) DEVICE — CORD ES L12FT BPLR FRCP

## (undated) DEVICE — BANDAGE CAST W6XL180IN PLSTR OF PARIS UNIFORMLY COAT HI DRY

## (undated) DEVICE — UNDERPAD HOSP W30XL36IN WHT SUP ABSRB POLYMER AIR PERM DISP

## (undated) DEVICE — Device

## (undated) DEVICE — SUTURE N ABSRB L 18 IN SZ 4-0 NDL L 19 MM NYL MONOFILAMENT

## (undated) DEVICE — WIPE INSTR W73XL73CM VISITEC

## (undated) DEVICE — PADDING CAST W4INXL4YD ST COT RAYON MICROPLEATED HIGHLY

## (undated) DEVICE — MATERIAL PD W2XL4YD ST COT CAST SPLNT NONADHESIVE SPEC

## (undated) DEVICE — ZIMMER® STERILE DISPOSABLE TOURNIQUET CUFF WITH PLC, DUAL PORT, SINGLE BLADDER, 18 IN. (46 CM)

## (undated) DEVICE — SUTURE ETHILON SZ 8-0 L5IN NONABSORBABLE BLK L5MM BV130-4 3/8 2815G

## (undated) DEVICE — BNDG,ELSTC,MATRIX,STRL,3"X5YD,LF,HOOK&LP: Brand: MEDLINE

## (undated) DEVICE — GLOVE SURG SZ 65 L12IN FNGR THK94MIL STD WHT LTX FREE

## (undated) DEVICE — SHARP TIP OPHTHALMIC SPONGE: Brand: MICROSPONGE

## (undated) DEVICE — GLOVE SURG SZ 65 L12IN FNGR THK79MIL GRN LTX FREE

## (undated) DEVICE — GLOVE ORANGE PI 7   MSG9070

## (undated) DEVICE — DRESSING,GAUZE,XEROFORM,CURAD,1"X8",ST: Brand: CURAD

## (undated) DEVICE — SEALANT TISS 4 CC FIBRIN VISTASEAL

## (undated) DEVICE — SOLUTION IV 500ML 0.9% SOD BOTTLE CHL LTWT DURABLE SHATTERPROOF